# Patient Record
Sex: FEMALE | Race: OTHER | HISPANIC OR LATINO | URBAN - METROPOLITAN AREA
[De-identification: names, ages, dates, MRNs, and addresses within clinical notes are randomized per-mention and may not be internally consistent; named-entity substitution may affect disease eponyms.]

---

## 2018-10-12 ENCOUNTER — EMERGENCY (EMERGENCY)
Facility: HOSPITAL | Age: 28
LOS: 1 days | Discharge: HOME | End: 2018-10-12
Attending: EMERGENCY MEDICINE | Admitting: EMERGENCY MEDICINE
Payer: COMMERCIAL

## 2018-10-12 VITALS
HEART RATE: 89 BPM | RESPIRATION RATE: 19 BRPM | SYSTOLIC BLOOD PRESSURE: 112 MMHG | OXYGEN SATURATION: 98 % | DIASTOLIC BLOOD PRESSURE: 69 MMHG

## 2018-10-12 VITALS
HEART RATE: 103 BPM | RESPIRATION RATE: 20 BRPM | WEIGHT: 115.08 LBS | DIASTOLIC BLOOD PRESSURE: 65 MMHG | OXYGEN SATURATION: 95 % | SYSTOLIC BLOOD PRESSURE: 105 MMHG | TEMPERATURE: 99 F

## 2018-10-12 PROCEDURE — 94640 AIRWAY INHALATION TREATMENT: CPT

## 2018-10-12 PROCEDURE — 99285 EMERGENCY DEPT VISIT HI MDM: CPT | Mod: 25

## 2018-10-12 PROCEDURE — 71046 X-RAY EXAM CHEST 2 VIEWS: CPT

## 2018-10-12 PROCEDURE — 71046 X-RAY EXAM CHEST 2 VIEWS: CPT | Mod: 26

## 2018-10-12 PROCEDURE — 99284 EMERGENCY DEPT VISIT MOD MDM: CPT | Mod: 25

## 2018-10-12 RX ORDER — IPRATROPIUM/ALBUTEROL SULFATE 18-103MCG
3 AEROSOL WITH ADAPTER (GRAM) INHALATION
Qty: 0 | Refills: 0 | Status: COMPLETED | OUTPATIENT
Start: 2018-10-12 | End: 2018-10-12

## 2018-10-12 RX ORDER — IPRATROPIUM/ALBUTEROL SULFATE 18-103MCG
3 AEROSOL WITH ADAPTER (GRAM) INHALATION
Qty: 0 | Refills: 0 | Status: DISCONTINUED | OUTPATIENT
Start: 2018-10-12 | End: 2018-10-12

## 2018-10-12 RX ORDER — DEXAMETHASONE 0.5 MG/5ML
10 ELIXIR ORAL ONCE
Qty: 0 | Refills: 0 | Status: DISCONTINUED | OUTPATIENT
Start: 2018-10-12 | End: 2018-10-12

## 2018-10-12 RX ORDER — DEXAMETHASONE 0.5 MG/5ML
10 ELIXIR ORAL ONCE
Qty: 0 | Refills: 0 | Status: COMPLETED | OUTPATIENT
Start: 2018-10-12 | End: 2018-10-12

## 2018-10-12 RX ORDER — ALBUTEROL 90 UG/1
2 AEROSOL, METERED ORAL
Qty: 1 | Refills: 0 | OUTPATIENT
Start: 2018-10-12 | End: 2018-11-10

## 2018-10-12 RX ADMIN — Medication 3 MILLILITER(S): at 02:45

## 2018-10-12 RX ADMIN — Medication 3 MILLILITER(S): at 02:30

## 2018-10-12 RX ADMIN — Medication 3 MILLILITER(S): at 03:00

## 2018-10-12 RX ADMIN — Medication 10 MILLIGRAM(S): at 03:17

## 2018-10-12 NOTE — ED ADULT NURSE NOTE - CADM POA URETHRAL CATHETER
Constitutional: No fever.  Pt eating and drinking well  Eyes: No discharge, erythema, pain, or edema.   ENMT: No ear pain. No Rhinorrhea. No congestion. No sore throat.   Cardiac: No CP or SOB  Respiratory: No cough  GI: No nausea, vomiting, diarrhea or abdominal pain  MS: No muscle weakness  Neuro: No headache or weakness. No LOC. No change in mental status   Skin: chin laceration No

## 2018-10-12 NOTE — ED PROVIDER NOTE - OBJECTIVE STATEMENT
28F no PMH c/o cough and SOB. pt states has been coughing for past 2 days.  +green phlegm , some chills. states today felt like tightness in her chest and it was difficult to take a deep breath. no vomiting, no recent travel. no LE swelling.  states a few people around her with colds at work.

## 2018-10-12 NOTE — ED ADULT NURSE NOTE - OBJECTIVE STATEMENT
Pt walked into Ed with c/o of wheezing, SOB Pt walked into Ed with c/o of wheezing, SOB, cough with green sputum. onset was 3 days ago. She states she subjectively felt warm. She denies medical HX. Denies CP, N+V+D, sick members at home.

## 2018-10-12 NOTE — ED PROVIDER NOTE - PROGRESS NOTE DETAILS
pt feeling better, speaking in full sentences, no resp distress.  will discharge with albuterol inhaler. recommend PMD f/u  I have discussed the discharge plan with the patient. The patient agrees with the plan, as discussed.  The patient understands Emergency Department diagnosis is a preliminary diagnosis often based on limited information and that the patient must adhere to the follow-up plan as discussed.  The patient understands that if the symptoms worsen or if prescribed medications do not have the desired/planned effect that the patient may return to the Emergency Department at any time for further evaluation and treatment.

## 2018-10-16 DIAGNOSIS — R05 COUGH: ICD-10-CM

## 2018-10-16 DIAGNOSIS — R06.02 SHORTNESS OF BREATH: ICD-10-CM

## 2018-10-16 DIAGNOSIS — Z88.1 ALLERGY STATUS TO OTHER ANTIBIOTIC AGENTS STATUS: ICD-10-CM

## 2018-10-16 DIAGNOSIS — R68.83 CHILLS (WITHOUT FEVER): ICD-10-CM

## 2019-12-08 ENCOUNTER — EMERGENCY (EMERGENCY)
Facility: HOSPITAL | Age: 29
LOS: 1 days | Discharge: HOME | End: 2019-12-08
Attending: EMERGENCY MEDICINE | Admitting: EMERGENCY MEDICINE
Payer: COMMERCIAL

## 2019-12-08 VITALS
HEART RATE: 84 BPM | RESPIRATION RATE: 16 BRPM | HEIGHT: 65 IN | OXYGEN SATURATION: 95 % | TEMPERATURE: 98 F | SYSTOLIC BLOOD PRESSURE: 116 MMHG | WEIGHT: 154.98 LBS | DIASTOLIC BLOOD PRESSURE: 70 MMHG

## 2019-12-08 VITALS
TEMPERATURE: 98 F | SYSTOLIC BLOOD PRESSURE: 113 MMHG | RESPIRATION RATE: 22 BRPM | OXYGEN SATURATION: 96 % | HEART RATE: 77 BPM | DIASTOLIC BLOOD PRESSURE: 72 MMHG

## 2019-12-08 LAB
RAPID RVP RESULT: DETECTED
RV+EV RNA SPEC QL NAA+PROBE: DETECTED

## 2019-12-08 PROCEDURE — 71046 X-RAY EXAM CHEST 2 VIEWS: CPT | Mod: 26

## 2019-12-08 PROCEDURE — 99283 EMERGENCY DEPT VISIT LOW MDM: CPT | Mod: 25

## 2019-12-08 PROCEDURE — 87581 M.PNEUMON DNA AMP PROBE: CPT

## 2019-12-08 PROCEDURE — 94640 AIRWAY INHALATION TREATMENT: CPT

## 2019-12-08 PROCEDURE — 87798 DETECT AGENT NOS DNA AMP: CPT

## 2019-12-08 PROCEDURE — 99285 EMERGENCY DEPT VISIT HI MDM: CPT | Mod: 25

## 2019-12-08 PROCEDURE — 93005 ELECTROCARDIOGRAM TRACING: CPT

## 2019-12-08 PROCEDURE — 71046 X-RAY EXAM CHEST 2 VIEWS: CPT

## 2019-12-08 PROCEDURE — 93010 ELECTROCARDIOGRAM REPORT: CPT | Mod: NC

## 2019-12-08 PROCEDURE — 87633 RESP VIRUS 12-25 TARGETS: CPT

## 2019-12-08 PROCEDURE — 87486 CHLMYD PNEUM DNA AMP PROBE: CPT

## 2019-12-08 RX ORDER — IPRATROPIUM/ALBUTEROL SULFATE 18-103MCG
3 AEROSOL WITH ADAPTER (GRAM) INHALATION ONCE
Refills: 0 | Status: COMPLETED | OUTPATIENT
Start: 2019-12-08 | End: 2019-12-08

## 2019-12-08 RX ORDER — ALBUTEROL 90 UG/1
2 AEROSOL, METERED ORAL ONCE
Refills: 0 | Status: COMPLETED | OUTPATIENT
Start: 2019-12-08 | End: 2019-12-08

## 2019-12-08 RX ADMIN — Medication 3 MILLILITER(S): at 06:22

## 2019-12-08 RX ADMIN — ALBUTEROL 2 PUFF(S): 90 AEROSOL, METERED ORAL at 06:52

## 2019-12-08 NOTE — ED ADULT NURSE NOTE - OBJECTIVE STATEMENT
Received a 29 year old female with a chief complaint of shortness of breath reported to have started as progressive cough and congestion. Patient with a PMH of Bronchitis.

## 2019-12-08 NOTE — ED PROVIDER NOTE - OBJECTIVE STATEMENT
29 healthy F p/w cough, sob and chest tightness since yesterday.  Pt reports Wednesday she woke up with sore throat, congestion, bodyaches and subjective fevers then yesterday progressed to productive cough white sputum.  She reports feeling tightness in her chest and sob with coughing; denies chest pain, leg pain/swelling, travel, exogenous hormone use.  Pt reports +sick contacts at work.  Denies headache, dizziness, 29 healthy F p/w cough, sob and chest tightness since yesterday.  Pt reports Wednesday she woke up with sore throat, congestion, bodyaches and subjective fevers then yesterday progressed to productive cough white sputum.  She reports feeling tightness in her chest and sob with coughing; denies chest pain, leg pain/swelling, travel, exogenous hormone use.  Pt reports +sick contacts at work.  Denies headache, dizziness, abd pain, nvd, urinary symptoms, rash.  Admits to thc use, no tobacco.

## 2019-12-08 NOTE — ED PROVIDER NOTE - PATIENT PORTAL LINK FT
You can access the FollowMyHealth Patient Portal offered by [facility] by registering at the following website: [email or web address]. By joining [employer]’s FollowMyHealth portal, you will also be able to view your health information using other applications (apps) compatible with our system.

## 2019-12-08 NOTE — ED PROVIDER NOTE - ATTENDING CONTRIBUTION TO CARE
29F no PMH p/w 3-4d of body aches, f/c, cough, rhinorrhea, sore throat. Also w/ 1d of feeling like she needs to take deep breaths/SOB/chest tightness. Denies associated NVD, lightheaded, diaphoresis, palpitations, black/bloody stool, LE pain/swelling, focal weakness/numbness, recent travel/immobilization, abd pain, urinary complaints. No hormone use. No FMH CAD/clots/sudden death. No known prior cardiac w/u.  Vitals wnl, exam as above.  ddx: Likely URI w/ aspect of RAD. clinically not ACS, PE, tamponade, dissection, PTX, perf, myocarditis, mediastinitis.   CXR wnl, given neb w/ much improvement. RVP sent off prior to my eval. Clinically no indication for further emergent ED workup or hospitalization at this time. comfortable for dc, outpt f/u.

## 2019-12-08 NOTE — ED PROVIDER NOTE - PHYSICAL EXAMINATION
Vitals reviewed  Gen: sitting comfortably in stretcher, nad, speaking in full sentences with intermittent coughing  Skin: wwp   HEENT: ncat, eomi, mmm, no tonsillar edema, uvula midline  Neck: supple  CV: rrr, no audible m/r/g  Resp: symmetrical expansion, ctab, exp wheezing   Abd: soft/nt  Ext: FROM throughout, no peripheral edema/calf ttp   Neuro: alert/oriented, no focal deficits, steady gait Vitals reviewed  Gen: sitting comfortably in stretcher, nad, speaking in full sentences with intermittent coughing  Skin: wwp   HEENT: ncat, eomi, mmm, no tonsillar edema, uvula midline  Neck: supple  CV: rrr, no audible m/r/g  Resp: symmetrical expansion, ctab, exp wheezing   Abd: soft/nt  Ext: FROM throughout, no peripheral edema/calf ttp   Neuro: alert/oriented, no focal deficits, steady gait    Klepfish: resp: good air entry b/l, diffuse expiratory wheeze, no resp distress. no LE edema, normal equal distal pulses.

## 2019-12-08 NOTE — ED PROVIDER NOTE - NSFOLLOWUPINSTRUCTIONS_ED_ALL_ED_FT
Use albuterol pump as needed every four hours as instructed in ED.  Continue over the counter cold and cough medications.  Follow up with primary doctor in one week.  Return to ED for fever, dizziness, fainting, chest pain, worsening shortness of breath, leg pain/swelling    Cough    Coughing is a reflex that clears your throat and your airways. Coughing helps to heal and protect your lungs. It is normal to cough occasionally, but a cough that happens with other symptoms or lasts a long time may be a sign of a condition that needs treatment. Coughing may be caused by infections, asthma or COPD, smoking, postnasal drip, gastroesophageal reflux, as well as other medical conditions. Take medicines only as instructed by your health care provider. Avoid environments or triggers that causes you to cough at work or at home.    SEEK IMMEDIATE MEDICAL CARE IF YOU HAVE ANY OF THE FOLLOWING SYMPTOMS: coughing up blood, shortness of breath, rapid heart rate, chest pain, unexplained weight loss or night sweats.

## 2019-12-08 NOTE — ED PROVIDER NOTE - CLINICAL SUMMARY MEDICAL DECISION MAKING FREE TEXT BOX
29 healthy F p/w cough, sob and chest tightness since yesterday; likely bronchitis, PERC negative, less likely PNA.  EKG nsr, no ischemic changes.  CXR clear, no acute infiltrate.  pt wheezing one exam likely 2/2 URI/reactive airway given duoneb with improvement of symptoms/wheezing.  given albuterol pump in ED and instructed to continue supportive measures/OTC cold medications.  pt afebrile, ambulating without dyspnea, stable for d/c.  discussed strict return parameters.  d/w attending

## 2019-12-08 NOTE — ED PROVIDER NOTE - DIAGNOSTIC INTERPRETATION
ER PA: Delicia Palacios  CHEST XRAY INTERPRETATION: lungs clear, heart shadow normal, bony structures intact

## 2019-12-08 NOTE — ED ADULT TRIAGE NOTE - ARRIVAL INFO ADDITIONAL COMMENTS
sob, chest pain, feels like can't take a full breathe, cough green phlegm yesterday, now clear. reports hx bronchitis

## 2019-12-12 DIAGNOSIS — R06.02 SHORTNESS OF BREATH: ICD-10-CM

## 2019-12-12 DIAGNOSIS — R07.89 OTHER CHEST PAIN: ICD-10-CM

## 2019-12-12 DIAGNOSIS — R09.81 NASAL CONGESTION: ICD-10-CM

## 2019-12-12 DIAGNOSIS — R05 COUGH: ICD-10-CM
